# Patient Record
Sex: MALE | Employment: FULL TIME | URBAN - METROPOLITAN AREA
[De-identification: names, ages, dates, MRNs, and addresses within clinical notes are randomized per-mention and may not be internally consistent; named-entity substitution may affect disease eponyms.]

---

## 2019-09-27 ENCOUNTER — OFFICE VISIT (OUTPATIENT)
Dept: URGENT CARE | Facility: PHYSICIAN GROUP | Age: 19
End: 2019-09-27
Payer: COMMERCIAL

## 2019-09-27 VITALS
TEMPERATURE: 98.2 F | BODY MASS INDEX: 22.41 KG/M2 | WEIGHT: 142.8 LBS | RESPIRATION RATE: 16 BRPM | HEIGHT: 67 IN | SYSTOLIC BLOOD PRESSURE: 112 MMHG | OXYGEN SATURATION: 98 % | DIASTOLIC BLOOD PRESSURE: 70 MMHG | HEART RATE: 86 BPM

## 2019-09-27 DIAGNOSIS — R04.0 EPISTAXIS: ICD-10-CM

## 2019-09-27 PROCEDURE — 99203 OFFICE O/P NEW LOW 30 MIN: CPT | Performed by: PHYSICIAN ASSISTANT

## 2019-09-27 ASSESSMENT — ENCOUNTER SYMPTOMS
SHORTNESS OF BREATH: 0
SORE THROAT: 0
FEVER: 0
EYE REDNESS: 0
VOMITING: 0
EYE DISCHARGE: 0
ABDOMINAL PAIN: 0
COUGH: 0
HEADACHES: 0
NAUSEA: 0
MYALGIAS: 0

## 2019-09-28 NOTE — PROGRESS NOTES
"Subjective:      Gosselin R E Jr Destil is a 19 y.o. male who presents with Epistaxis (on and off x 3 days)        Epistaxis    The bleeding has been from both nares. This is a new problem. Episode onset: x 3 days. The problem occurs every few hours. The problem has been resolved. The bleeding is associated with dry air. He has tried nothing for the symptoms. His past medical history is significant for colds.     The patient presents to clinic c/o intermittent nosebleeds x 3 days. The patient states his nosebleed episodes are becoming less frequent . The patient states he had 6 episodes the first day, followed by 4 episodes the next day, and 2 episodes today. The patient reports associated nasal congestion secondary to a cold. The patient has been blowing his nose more frequently. The patient denies injury or trauma to his nose. No fever. No shortness of breath. No cough.     PMH:  has no past medical history on file.  MEDS: No current outpatient medications on file.  ALLERGIES:   Allergies   Allergen Reactions   • Pcn [Penicillins] Rash     Rash      SURGHX: No past surgical history on file.  SOCHX:    FH: Family history was reviewed, no pertinent findings to report    Review of Systems   Constitutional: Negative for fever.   HENT: Positive for congestion and nosebleeds. Negative for ear pain and sore throat.    Eyes: Negative for discharge and redness.   Respiratory: Negative for cough and shortness of breath.    Cardiovascular: Negative for chest pain and leg swelling.   Gastrointestinal: Negative for abdominal pain, nausea and vomiting.   Musculoskeletal: Negative for myalgias.   Skin: Negative for rash.   Neurological: Negative for headaches.   All other systems reviewed and are negative.         Objective:     /70 (BP Location: Right arm, Patient Position: Sitting, BP Cuff Size: Adult)   Pulse 86   Temp 36.8 °C (98.2 °F) (Tympanic)   Resp 16   Ht 1.702 m (5' 7\")   Wt 64.8 kg (142 lb 12.8 oz)   " SpO2 98%   BMI 22.37 kg/m²      Physical Exam   Constitutional: He is oriented to person, place, and time. He appears well-developed and well-nourished. No distress.   HENT:   Head: Normocephalic and atraumatic.   Right Ear: External ear normal.   Left Ear: External ear normal.   Nose: Nose normal. No nasal septal hematoma. No epistaxis.   Bilateral Nares:  Scabs present to the bilateral nares. No active bleeding.    Eyes: Conjunctivae and EOM are normal.   Neck: Normal range of motion. Neck supple.   Cardiovascular: Normal rate.   Pulmonary/Chest: Effort normal.   Musculoskeletal: Normal range of motion.   Neurological: He is alert and oriented to person, place, and time.   Skin: Skin is warm and dry.               Assessment/Plan:     1. Epistaxis    Differential diagnoses, supportive care, and indications for immediate follow-up discussed with patient.   Instructed to return to clinic or nearest emergency department for any change in condition, further concerns, or worsening of symptoms.    Apply Vaseline to the bilateral nares  OTC saline nasal spray as needed  OTC Afrin for symptomatic relief of nosebleeds  AVS printed  Follow-up with PCP  Return to clinic if symptoms worsen or fail to improve, or if the patient should develop worsening/increasing/persistent nosebleeds, worsening congestion, fever/chills, and or any concerning symptoms.    Discussed plan with the patient, and he agrees to the above.

## 2019-09-28 NOTE — PATIENT INSTRUCTIONS
Nosebleed, Adult  A nosebleed is when blood comes out of the nose. Nosebleeds are common. Usually, they are not a sign of a serious condition.  Nosebleeds can happen if a small blood vessel in your nose starts to bleed or if the lining of your nose (mucous membrane) cracks. They are commonly caused by:  · Allergies.  · Colds.  · Picking your nose.  · Blowing your nose too hard.  · An injury from sticking an object into your nose or getting hit in the nose.  · Dry or cold air.  Less common causes of nosebleeds include:  · Toxic fumes.  · Something abnormal in the nose or in the air-filled spaces in the bones of the face (sinuses).  · Growths in the nose, such as polyps.  · Medicines or conditions that cause blood to clot slowly.  · Certain illnesses or procedures that irritate or dry out the nasal passages.  Follow these instructions at home:  When you have a nosebleed:  · Sit down and tilt your head slightly forward.  · Use a clean towel or tissue to pinch your nostrils under the bony part of your nose. After 10 minutes, let go of your nose and see if bleeding starts again. Do not release pressure before that time. If there is still bleeding, repeat the pinching and holding for 10 minutes until the bleeding stops.  · Do not place tissues or gauze in the nose to stop bleeding.  · Avoid lying down and avoid tilting your head backward. That may make blood collect in the throat and cause gagging or coughing.  · Use a nasal spray decongestant to help with a nosebleed as told by your health care provider.  · Do not use petroleum jelly or mineral oil in your nose. It can drip into your lungs.  After a nosebleed:  · Avoid blowing your nose or sniffing for a number of hours.  · Avoid straining, lifting, or bending at the waist for several days. You may resume other normal activities as you are able.  · Use saline spray or a humidifier as told by your health care provider.  · Aspirin and blood thinners make bleeding more  likely. If you are prescribed these medicines and you suffer from nosebleeds:  ¨ Ask your health care provider if you should stop taking the medicines or if you should adjust the dose.  ¨ Do not stop taking medicines that your health care provider has recommended unless told by your health care provider.  · If your nosebleed was caused by dry mucous membranes, use over-the-counter saline nasal spray or gel. This will keep the mucous membranes moist and allow them to heal. If you must use a lubricant:  ¨ Choose one that is water-soluble.  ¨ Use only as much as you need and use it only as often as needed.  ¨ Do not lie down until several hours after you use it.  Contact a health care provider if:  · You have a fever.  · You get nosebleeds often or more often than usual.  · You bruise very easily.  · You have a nosebleed from having something stuck in your nose.  · You have bleeding in your mouth.  · You vomit or cough up brown material.  · You have a nosebleed after you start a new medicine.  Get help right away if:  · You have a nosebleed after a fall or a head injury.  · Your nosebleed does not go away after 20 minutes.  · You feel dizzy or weak.  · You have unusual bleeding from other parts of your body.  · You have unusual bruising on other parts of your body.  · You become sweaty.  · You vomit blood.  This information is not intended to replace advice given to you by your health care provider. Make sure you discuss any questions you have with your health care provider.  Document Released: 09/27/2006 Document Revised: 08/17/2017 Document Reviewed: 07/04/2017  Elsevier Interactive Patient Education © 2017 [employer]

## 2020-07-18 ENCOUNTER — HOSPITAL ENCOUNTER (OUTPATIENT)
Facility: MEDICAL CENTER | Age: 20
End: 2020-07-18
Attending: PHYSICIAN ASSISTANT
Payer: COMMERCIAL

## 2020-07-18 ENCOUNTER — OFFICE VISIT (OUTPATIENT)
Dept: URGENT CARE | Facility: PHYSICIAN GROUP | Age: 20
End: 2020-07-18
Payer: COMMERCIAL

## 2020-07-18 VITALS
RESPIRATION RATE: 18 BRPM | SYSTOLIC BLOOD PRESSURE: 112 MMHG | HEIGHT: 67 IN | WEIGHT: 145 LBS | HEART RATE: 88 BPM | TEMPERATURE: 97.8 F | OXYGEN SATURATION: 95 % | DIASTOLIC BLOOD PRESSURE: 82 MMHG | BODY MASS INDEX: 22.76 KG/M2

## 2020-07-18 DIAGNOSIS — Z20.822 EXPOSURE TO COVID-19 VIRUS: ICD-10-CM

## 2020-07-18 DIAGNOSIS — J06.9 VIRAL URI WITH COUGH: ICD-10-CM

## 2020-07-18 PROCEDURE — 99214 OFFICE O/P EST MOD 30 MIN: CPT | Mod: CS | Performed by: PHYSICIAN ASSISTANT

## 2020-07-18 PROCEDURE — U0003 INFECTIOUS AGENT DETECTION BY NUCLEIC ACID (DNA OR RNA); SEVERE ACUTE RESPIRATORY SYNDROME CORONAVIRUS 2 (SARS-COV-2) (CORONAVIRUS DISEASE [COVID-19]), AMPLIFIED PROBE TECHNIQUE, MAKING USE OF HIGH THROUGHPUT TECHNOLOGIES AS DESCRIBED BY CMS-2020-01-R: HCPCS

## 2020-07-18 SDOH — HEALTH STABILITY: MENTAL HEALTH: HOW OFTEN DO YOU HAVE A DRINK CONTAINING ALCOHOL?: MONTHLY OR LESS

## 2020-07-18 ASSESSMENT — ENCOUNTER SYMPTOMS
SORE THROAT: 1
EYE REDNESS: 0
MYALGIAS: 0
CHILLS: 0
BRUISES/BLEEDS EASILY: 0
DIARRHEA: 0
VOMITING: 0
FEVER: 0
ABDOMINAL PAIN: 0
COUGH: 1
PALPITATIONS: 0
NAUSEA: 0
HEADACHES: 1
EYE DISCHARGE: 0
DIZZINESS: 0
SPUTUM PRODUCTION: 0
WHEEZING: 0
SHORTNESS OF BREATH: 0

## 2020-07-18 NOTE — PROGRESS NOTES
Subjective:      Gosselin R E Jr Destil is a 19 y.o. male who presents with Sore Throat (headaches, cough, exposed to covid, x3 days )    HPI:  This is a new problem. Gosselin R E Jr Destil is a 19 y.o. male who presents today for evaluation of URI symptoms.  Patient states that since yesterday he has had mild cough, headache, and sore throat.  Says a sore throat is worse in the mornings and gets better throughout the day but still feels irritated constantly.  Cough is nonproductive.  No associated shortness of breath or chest pain.  No fever/chills or lightheadedness/dizziness.  Patient does not take anything for symptoms.  A coworker, who he last interacted with 5 days ago, tested positive for COVID yesterday.  Reports no history of asthma or heart disease.  No chronic medical conditions or medications that he takes on a daily basis.      Review of Systems   Constitutional: Positive for malaise/fatigue. Negative for chills and fever.   HENT: Positive for sore throat.    Eyes: Negative for discharge and redness.   Respiratory: Positive for cough. Negative for sputum production, shortness of breath and wheezing.    Cardiovascular: Negative for chest pain and palpitations.   Gastrointestinal: Negative for abdominal pain, diarrhea, nausea and vomiting.   Musculoskeletal: Negative for myalgias.   Skin: Negative for rash.   Neurological: Positive for headaches. Negative for dizziness.   Endo/Heme/Allergies: Positive for environmental allergies. Does not bruise/bleed easily.       PMH:  has no past medical history on file.  MEDS: No current outpatient medications on file.  ALLERGIES:   Allergies   Allergen Reactions   • Pcn [Penicillins] Rash     Rash      SURGHX: No past surgical history on file.  SOCHX:  reports that he has never smoked. He has never used smokeless tobacco. He reports current alcohol use. He reports current drug use. Drug: Marijuana.  FH: Family history was reviewed, no pertinent findings to  "report     Objective:     /82 (BP Location: Right arm, Patient Position: Sitting, BP Cuff Size: Adult)   Pulse 88   Temp 36.6 °C (97.8 °F) (Temporal)   Resp 18   Ht 1.702 m (5' 7\")   Wt 65.8 kg (145 lb)   SpO2 95%   BMI 22.71 kg/m²      Physical Exam  Constitutional:       Appearance: He is well-developed.   HENT:      Head: Normocephalic and atraumatic.      Right Ear: Tympanic membrane, ear canal and external ear normal.      Left Ear: Tympanic membrane, ear canal and external ear normal.      Nose: No mucosal edema, congestion or rhinorrhea.      Right Nostril: Epistaxis (Mild resolving epistaxis noted in the right nostril from the COVID swab) present. No foreign body, septal hematoma or occlusion.      Mouth/Throat:      Lips: Pink.      Mouth: Mucous membranes are moist.      Pharynx: Oropharynx is clear.   Eyes:      Conjunctiva/sclera: Conjunctivae normal.      Pupils: Pupils are equal, round, and reactive to light.   Neck:      Musculoskeletal: Normal range of motion.   Cardiovascular:      Rate and Rhythm: Normal rate and regular rhythm.      Heart sounds: Normal heart sounds. No murmur.   Pulmonary:      Effort: Pulmonary effort is normal.      Breath sounds: Normal breath sounds. No decreased breath sounds, wheezing, rhonchi or rales.   Lymphadenopathy:      Cervical: No cervical adenopathy.   Skin:     General: Skin is warm and dry.      Capillary Refill: Capillary refill takes less than 2 seconds.   Neurological:      Mental Status: He is alert and oriented to person, place, and time.   Psychiatric:         Behavior: Behavior normal.         Judgment: Judgment normal.          Assessment/Plan:     1. Exposure to COVID-19 virus  - COVID/SARS CoV-2 PCR; Future  *Patient had a nasal swab to test for COVID-19 virus.  Patient was advised to stay home and self isolate/self quarantine while awaiting the results.  Supportive care was reiterated.  Return/ER precautions discussed.     2. Viral URI " with cough  - OTC cold/flu medications  - PO fluids  - Rest  - Tylenol or ibuprofen as needed for fever > 100.4 F  -Supportive care discussed to include salt water gargles, throat lozenges, and increased fluid intake          Differential Diagnosis, natural history, and supportive care discussed. Return to the Urgent Care or follow up with your PCP if symptoms fail to resolve, or for any new or worsening symptoms. Emergency room precautions discussed. Patient and/or family appears understanding of information.

## 2020-07-19 LAB — COVID ORDER STATUS COVID19: NORMAL

## 2020-07-20 LAB
SARS-COV-2 RNA RESP QL NAA+PROBE: NOTDETECTED
SPECIMEN SOURCE: NORMAL

## 2021-01-26 ENCOUNTER — HOSPITAL ENCOUNTER (OUTPATIENT)
Facility: MEDICAL CENTER | Age: 21
End: 2021-01-26
Attending: PHYSICIAN ASSISTANT
Payer: COMMERCIAL

## 2021-01-26 ENCOUNTER — OFFICE VISIT (OUTPATIENT)
Dept: URGENT CARE | Facility: PHYSICIAN GROUP | Age: 21
End: 2021-01-26
Payer: COMMERCIAL

## 2021-01-26 VITALS
HEART RATE: 67 BPM | SYSTOLIC BLOOD PRESSURE: 98 MMHG | BODY MASS INDEX: 22.64 KG/M2 | DIASTOLIC BLOOD PRESSURE: 64 MMHG | TEMPERATURE: 98.9 F | WEIGHT: 149.4 LBS | OXYGEN SATURATION: 99 % | HEIGHT: 68 IN | RESPIRATION RATE: 12 BRPM

## 2021-01-26 DIAGNOSIS — R09.81 SINUS CONGESTION: ICD-10-CM

## 2021-01-26 PROCEDURE — 99213 OFFICE O/P EST LOW 20 MIN: CPT | Performed by: PHYSICIAN ASSISTANT

## 2021-01-26 PROCEDURE — U0003 INFECTIOUS AGENT DETECTION BY NUCLEIC ACID (DNA OR RNA); SEVERE ACUTE RESPIRATORY SYNDROME CORONAVIRUS 2 (SARS-COV-2) (CORONAVIRUS DISEASE [COVID-19]), AMPLIFIED PROBE TECHNIQUE, MAKING USE OF HIGH THROUGHPUT TECHNOLOGIES AS DESCRIBED BY CMS-2020-01-R: HCPCS

## 2021-01-26 PROCEDURE — U0005 INFEC AGEN DETEC AMPLI PROBE: HCPCS

## 2021-01-26 ASSESSMENT — ENCOUNTER SYMPTOMS
MUSCULOSKELETAL NEGATIVE: 1
DIZZINESS: 0
ABDOMINAL PAIN: 0
DIARRHEA: 0
VOMITING: 0
COUGH: 0
EYE DISCHARGE: 0
SHORTNESS OF BREATH: 0
HEADACHES: 1
EYE REDNESS: 0
CHILLS: 0
FEVER: 0
MYALGIAS: 0
NAUSEA: 0

## 2021-01-27 DIAGNOSIS — R09.81 SINUS CONGESTION: ICD-10-CM

## 2021-01-27 LAB
COVID ORDER STATUS COVID19: NORMAL
SARS-COV-2 RNA RESP QL NAA+PROBE: NOTDETECTED
SPECIMEN SOURCE: NORMAL

## 2021-01-27 NOTE — PROGRESS NOTES
"Subjective:      Gosselin R E Jr Destil is a 20 y.o. male who presents with Headache (x3 days Roomates are coughing, wants COVID test, has runny nose )            HPI  Patient presents to urgent care reporting a 3 day history of nasal congestion and headache. His roommates have been coughing for about 1.5 weeks and he is concerned for covid-19. The have not been tested. No fevers, chills, body aches, cough, chest pain, SOB, or loss of taste/smell. He has no known medical problems and doesn't take any regular medications.       Review of Systems   Constitutional: Negative for chills and fever.   HENT: Positive for congestion. Negative for ear pain.    Eyes: Negative for discharge and redness.   Respiratory: Negative for cough and shortness of breath.    Cardiovascular: Negative for chest pain.   Gastrointestinal: Negative for abdominal pain, diarrhea, nausea and vomiting.   Genitourinary: Negative.    Musculoskeletal: Negative.  Negative for myalgias.   Skin: Negative for rash.   Neurological: Positive for headaches. Negative for dizziness.         Objective:     BP (!) 98/64 (BP Location: Left arm, Patient Position: Sitting, BP Cuff Size: Adult)   Pulse 67   Temp 37.2 °C (98.9 °F) (Temporal)   Resp 12   Ht 1.727 m (5' 8\")   Wt 67.8 kg (149 lb 6.4 oz)   SpO2 99%   BMI 22.72 kg/m²      Physical Exam  Vitals signs and nursing note reviewed.   Constitutional:       Appearance: Normal appearance. He is well-developed.   HENT:      Head: Normocephalic and atraumatic.      Right Ear: External ear normal.      Left Ear: External ear normal.      Nose:      Comments: Patient wearing mask  Eyes:      Conjunctiva/sclera: Conjunctivae normal.   Neck:      Musculoskeletal: Normal range of motion.   Cardiovascular:      Rate and Rhythm: Normal rate and regular rhythm.      Heart sounds: Normal heart sounds. No murmur.   Pulmonary:      Effort: Pulmonary effort is normal.      Breath sounds: Normal breath sounds. No " wheezing or rales.   Musculoskeletal: Normal range of motion.   Skin:     General: Skin is warm and dry.   Neurological:      Mental Status: He is alert and oriented to person, place, and time.   Psychiatric:         Behavior: Behavior normal.          PMH:  has no past medical history on file.  MEDS: No current outpatient medications on file.  ALLERGIES:   Allergies   Allergen Reactions   • Pcn [Penicillins] Rash     Rash      SURGHX: History reviewed. No pertinent surgical history.  SOCHX:  reports that he has never smoked. He has never used smokeless tobacco. He reports current alcohol use. He reports current drug use. Drug: Marijuana.  FH: family history is not on file.       Assessment/Plan:        1. Sinus congestion    - SARS-CoV-2 PCR (24 hour In-House): Collect NP swab in [facility]; Future    Advised patient symptoms are most likely viral in etiology. Increased fluids and rest. Discussed use of OTC cough and cold medication and Tylenol/Motrin for symptomatic relief.  Return for reevaluation or follow-up with PCP if symptoms persist or worsen. Supportive care, differential diagnoses, and indications for immediate follow-up discussed with patient.   Pathogenesis of diagnosis discussed including typical length and natural progression.  The patient demonstrated a good understanding and agreed with the treatment plan and has no further questions regarding care.   Vital signs stable, patient in no acute respiratory distress. Patient instructed to self-isolate/quarantine. Discharge handout given.      Discussed with patient at length importance of communal effort to help decrease the infection rate of COVID 19. Patient advised to avoid large gatherings of people and practice good hand hygiene and respiratory precautions.       This patient is evaluated under [facility] isolation protocols in urgent care.  Out of an abundance of caution I am wearing a N95 mask, protective eye gear, gloves and gown through all interaction  with patient.

## 2021-01-27 NOTE — PATIENT INSTRUCTIONS
INSTRUCTIONS FOR COVID-19 OR ANY OTHER INFECTIOUS RESPIRATORY ILLNESSES    The Centers for Disease Control and Prevention (CDC) states that early indications for COVID-19 include cough, shortness of breath, difficulty breathing, or at least two of the following symptoms: chills, shaking with chills, muscle pain, headache, sore throat, and loss of taste or smell. Symptoms can range from mild to severe and may appear up to two weeks after exposure to the virus.    The practice of self-isolation and quarantine helps protect the public and your family by  preventing exposure to people who have or may have a contagious disease. Please follow the prevention steps below as based on CDC guidelines:    WHEN TO STOP ISOLATION: Persons with COVID-19 or any other infectious respiratory illness who have symptoms and were advised to care for themselves at home may discontinue home isolation under the following conditions:  · At least 24 hours have passed since recovery defined as resolution of fever without the use of fever-reducing medications; AND,  · Improvement in respiratory symptoms (e.g., cough, shortness of breath); AND,  · At least 10 days have passed since symptoms first appeared and have had no subsequent illness.    MONITOR YOUR SYMPTOMS: If your illness is worsening, seek prompt medical attention. If you have a medical emergency and need to call 911, notify the dispatch personnel that you have, or are being evaluated for confirmed or suspected COVID-19 or another infectious respiratory illness. Wear a facemask if possible.    ACTIVITY RESTRICTION: restrict activities outside your home, except for getting medical care. Do not go to work, school, or public areas. Avoid using public transportation, ride-sharing, or taxis.    SCHEDULED MEDICAL APPOINTMENTS: Notify your provider that you have, or are being evaluated for, confirmed or suspected COVID-19 or another infectious respiratory. This will help the healthcare  provider’s office safely take care of you and keep other people from getting exposed or infected.    FACEMASKS, when to wear: Anytime you are away from your home or around other people or pets. If you are unable to wear one, maintain a minimum of 6 feet distancing from others.    LIVING ENVIRONMENT: Stay in a separate room from other people and pets. If possible, use a separate bathroom, have someone else care for your pets and avoid sharing household items. Any items used should be washed thoroughly with soap and water. Clean all “high-touch” surfaces every day. Use a household cleaning spray or wipe, according to the label instructions. High touch surfaces include (but are not limited to) counters, tabletops, doorknobs, bathroom fixtures, toilets, phones, keyboards, tablets, and bedside tables.     HAND WASHING: Frequently wash hands with soap and water for at least 20 seconds,  especially after blowing your nose, coughing, or sneezing; going to the bathroom; before and after interacting with pets; and before and after eating or preparing food. If hands are visibly dirty use soap and water. If soap and water are not available, use an alcohol-based hand  with at least 60% alcohol. Avoid touching your eyes, nose, and mouth with unwashed hands. Cover your coughs and sneezes with a tissue. Throw used tissues in a lined trash can. Immediately wash your hands.    ACTIVE/FACILITATED SELF-MONITORING: Follow instructions provided by your local health department or health professionals, as appropriate. When working with your local health department check their available hours.    [facility]   Phone Number   [facility] [phone]   [facility] [place] [phone]   [place] [address]   [place] [phone]     IF YOU HAVE CONFIRMED POSITIVE COVID-19:    Those who have completely recovered from COVID-19 may have immune-boosting antibodies in their plasma--called “convalescent plasma”--that could be  used to treat critically ill COVID19 patients.    [facility] is excited to begin working with [facility] on collecting convalescent plasma from  people who have recovered from COVID-19 as part of a program to treat patients infected with the virus. This FDA-approved “emergency investigational new drug” is a special blood product containing antibodies that may give patients an extra boost to fight the virus.    To be eligible to donate convalescent plasma, you must have a prior COVID-19 diagnosis documented by a laboratory test (or a positive test result for SARS-CoV-2 antibodies) and meet additional eligibility requirements.    If you are interested in donating convalescent plasma or have any additional questions, please contact the [facility] Convalescent Plasma  at [phone] or via e-mail at [email or web address].